# Patient Record
Sex: FEMALE | Race: WHITE | NOT HISPANIC OR LATINO | URBAN - METROPOLITAN AREA
[De-identification: names, ages, dates, MRNs, and addresses within clinical notes are randomized per-mention and may not be internally consistent; named-entity substitution may affect disease eponyms.]

---

## 2023-09-21 LAB
ALANINE AMINOTRANSFERASE (SGPT) (U/L) IN SER/PLAS: 21 U/L (ref 7–45)
ALBUMIN (G/DL) IN SER/PLAS: 4.3 G/DL (ref 3.4–5)
ALKALINE PHOSPHATASE (U/L) IN SER/PLAS: 71 U/L (ref 33–136)
ANION GAP IN SER/PLAS: 11 MMOL/L (ref 10–20)
ASPARTATE AMINOTRANSFERASE (SGOT) (U/L) IN SER/PLAS: 27 U/L (ref 9–39)
BILIRUBIN TOTAL (MG/DL) IN SER/PLAS: 0.6 MG/DL (ref 0–1.2)
CALCIUM (MG/DL) IN SER/PLAS: 9.4 MG/DL (ref 8.6–10.3)
CARBON DIOXIDE, TOTAL (MMOL/L) IN SER/PLAS: 28 MMOL/L (ref 21–32)
CHLORIDE (MMOL/L) IN SER/PLAS: 107 MMOL/L (ref 98–107)
CHOLESTEROL (MG/DL) IN SER/PLAS: 170 MG/DL (ref 0–199)
CHOLESTEROL IN HDL (MG/DL) IN SER/PLAS: 71.1 MG/DL
CHOLESTEROL/HDL RATIO: 2.4
CREATININE (MG/DL) IN SER/PLAS: 0.78 MG/DL (ref 0.5–1.05)
GFR FEMALE: 80 ML/MIN/1.73M2
GLUCOSE (MG/DL) IN SER/PLAS: 98 MG/DL (ref 74–99)
LDL: 80 MG/DL (ref 0–99)
POTASSIUM (MMOL/L) IN SER/PLAS: 3.4 MMOL/L (ref 3.5–5.3)
PROTEIN TOTAL: 7.6 G/DL (ref 6.4–8.2)
SODIUM (MMOL/L) IN SER/PLAS: 143 MMOL/L (ref 136–145)
THYROTROPIN (MIU/L) IN SER/PLAS BY DETECTION LIMIT <= 0.05 MIU/L: 1.35 MIU/L (ref 0.44–3.98)
THYROXINE (T4) FREE (NG/DL) IN SER/PLAS: 0.81 NG/DL (ref 0.61–1.12)
TRIGLYCERIDE (MG/DL) IN SER/PLAS: 93 MG/DL (ref 0–149)
UREA NITROGEN (MG/DL) IN SER/PLAS: 12 MG/DL (ref 6–23)
VLDL: 19 MG/DL (ref 0–40)

## 2023-10-27 ENCOUNTER — TELEPHONE (OUTPATIENT)
Dept: PRIMARY CARE | Facility: CLINIC | Age: 73
End: 2023-10-27
Payer: COMMERCIAL

## 2024-07-16 DIAGNOSIS — I10 ESSENTIAL HYPERTENSION: ICD-10-CM

## 2024-07-16 PROBLEM — R93.89 ABNORMAL THYROID ULTRASOUND: Status: ACTIVE | Noted: 2024-07-16

## 2024-07-16 PROBLEM — L65.9 THINNING HAIR: Status: ACTIVE | Noted: 2024-07-16

## 2024-07-16 PROBLEM — K63.5 POLYP OF TRANSVERSE COLON: Status: ACTIVE | Noted: 2017-05-25

## 2024-07-16 PROBLEM — K57.30 DIVERTICULOSIS OF LARGE INTESTINE WITHOUT HEMORRHAGE: Status: ACTIVE | Noted: 2017-05-25

## 2024-07-16 PROBLEM — H05.20 EXOPHTHALMOS: Status: ACTIVE | Noted: 2024-07-16

## 2024-07-16 PROBLEM — E78.5 HYPERLIPIDEMIA: Status: ACTIVE | Noted: 2024-07-16

## 2024-07-16 PROBLEM — F17.210 LIGHT CIGARETTE SMOKER: Status: ACTIVE | Noted: 2024-07-16

## 2024-07-16 PROBLEM — R79.89 ELEVATED TSH: Status: ACTIVE | Noted: 2024-07-16

## 2024-07-16 PROBLEM — E04.2 MULTIPLE THYROID NODULES: Status: ACTIVE | Noted: 2024-07-16

## 2024-07-16 PROBLEM — G47.00 INSOMNIA: Status: ACTIVE | Noted: 2024-07-16

## 2024-07-16 RX ORDER — AMLODIPINE BESYLATE AND ATORVASTATIN CALCIUM 5; 10 MG/1; MG/1
1 TABLET, FILM COATED ORAL DAILY
COMMUNITY
Start: 2022-06-21

## 2024-07-17 RX ORDER — AMLODIPINE BESYLATE AND ATORVASTATIN CALCIUM 5; 10 MG/1; MG/1
1 TABLET, FILM COATED ORAL DAILY
Qty: 90 TABLET | Refills: 3 | Status: SHIPPED | OUTPATIENT
Start: 2024-07-17

## 2024-09-23 ENCOUNTER — APPOINTMENT (OUTPATIENT)
Dept: PRIMARY CARE | Facility: CLINIC | Age: 74
End: 2024-09-23
Payer: COMMERCIAL

## 2024-09-23 VITALS
BODY MASS INDEX: 23.19 KG/M2 | WEIGHT: 126 LBS | HEART RATE: 56 BPM | HEIGHT: 62 IN | DIASTOLIC BLOOD PRESSURE: 70 MMHG | SYSTOLIC BLOOD PRESSURE: 110 MMHG | TEMPERATURE: 97.7 F

## 2024-09-23 DIAGNOSIS — Z23 IMMUNIZATION DUE: ICD-10-CM

## 2024-09-23 DIAGNOSIS — I10 ESSENTIAL HYPERTENSION: ICD-10-CM

## 2024-09-23 DIAGNOSIS — Z12.31 SCREENING MAMMOGRAM FOR BREAST CANCER: ICD-10-CM

## 2024-09-23 DIAGNOSIS — E04.1 THYROID NODULE: ICD-10-CM

## 2024-09-23 DIAGNOSIS — Z12.11 SCREENING FOR COLON CANCER: ICD-10-CM

## 2024-09-23 DIAGNOSIS — Z00.00 ROUTINE GENERAL MEDICAL EXAMINATION AT HEALTH CARE FACILITY: Primary | ICD-10-CM

## 2024-09-23 PROCEDURE — 3074F SYST BP LT 130 MM HG: CPT | Performed by: FAMILY MEDICINE

## 2024-09-23 PROCEDURE — 1124F ACP DISCUSS-NO DSCNMKR DOCD: CPT | Performed by: FAMILY MEDICINE

## 2024-09-23 PROCEDURE — 1159F MED LIST DOCD IN RCRD: CPT | Performed by: FAMILY MEDICINE

## 2024-09-23 PROCEDURE — 90677 PCV20 VACCINE IM: CPT | Performed by: FAMILY MEDICINE

## 2024-09-23 PROCEDURE — 3008F BODY MASS INDEX DOCD: CPT | Performed by: FAMILY MEDICINE

## 2024-09-23 PROCEDURE — 3078F DIAST BP <80 MM HG: CPT | Performed by: FAMILY MEDICINE

## 2024-09-23 PROCEDURE — G0009 ADMIN PNEUMOCOCCAL VACCINE: HCPCS | Performed by: FAMILY MEDICINE

## 2024-09-23 PROCEDURE — G0439 PPPS, SUBSEQ VISIT: HCPCS | Performed by: FAMILY MEDICINE

## 2024-09-23 PROCEDURE — 1036F TOBACCO NON-USER: CPT | Performed by: FAMILY MEDICINE

## 2024-09-23 RX ORDER — AMLODIPINE BESYLATE AND ATORVASTATIN CALCIUM 5; 10 MG/1; MG/1
1 TABLET, FILM COATED ORAL DAILY
Qty: 90 TABLET | Refills: 3 | Status: SHIPPED | OUTPATIENT
Start: 2024-09-23

## 2024-09-23 ASSESSMENT — PATIENT HEALTH QUESTIONNAIRE - PHQ9
2. FEELING DOWN, DEPRESSED OR HOPELESS: NOT AT ALL
SUM OF ALL RESPONSES TO PHQ9 QUESTIONS 1 AND 2: 0
1. LITTLE INTEREST OR PLEASURE IN DOING THINGS: NOT AT ALL

## 2024-09-23 NOTE — PROGRESS NOTES
"    /70   Pulse 56   Temp 36.5 °C (97.7 °F)   Ht 1.575 m (5' 2\")   Wt 57.2 kg (126 lb)   BMI 23.05 kg/m²     No past medical history on file.    Patient Active Problem List   Diagnosis    Abnormal thyroid ultrasound    Diverticulosis of large intestine without hemorrhage    Elevated TSH    Essential hypertension    Exophthalmos    Hyperlipidemia    Insomnia    Light cigarette smoker    Multiple thyroid nodules    Polyp of transverse colon    Thinning hair       Current Outpatient Medications   Medication Sig Dispense Refill    amlodipine-atorvastatin (Caduet) 5-10 mg tablet TAKE 1 TABLET BY MOUTH DAILY 90 tablet 3    amlodipine-atorvastatin (Caduet) 5-10 mg tablet Take 1 tablet by mouth once daily.       No current facility-administered medications for this visit.       CC/HPI/ASSESSMENT/PLAN    CC annual wellness visit    HPI patient 74-year-old female here for wellness visit.  No cognitive deficits noted.  Immunizations reviewed, she is agreeable to pneumonia vaccine, did not declines other vaccines.  She is overdue for colonoscopy.  She declines mammography.  She like an order for blood work.  She will need refills for her medications.  Blood pressure under good control.  Denies side effect of medication.  Patient with a history of thyroid goiter will need repeat ultrasound this year.  She sees ENT regards to the thyroid nodule as well.  Denies fever chills chest pain palpitation short of breath abdominal pain.  ROS negative except as noted above.  Past medical social surgical history is reviewed    Exam: Vital stable eyes no jaundice mouth moist neck supple no carotid bruit no goiter lungs CTA CV RRR Ext no edema skin no rash.  Examination of the head reveals indentations of the skull on the forehead with veins that will engorged.  The area is nontender    A/P 1.  Annual wellness visit.  No cognitive deficits noted.  She declines mammography.  Colonoscopy ordered.  Blood work is ordered.  Medicines " "refilled.  She will to have ultrasound of thyroid and follow-up with ENT.  I recommended that patient pursue consultation with a neurosurgeon to evaluate the abnormality noted of the forehead skull with vein present as discussed above.  Patient is agreeable.  Follow-up 1 year or sooner as needed    There are no diagnoses linked to this encounter. Subjective   Reason for Visit: Krystyna Toussaint is an 74 y.o. female here for a Medicare Wellness visit.          Reviewed all medications by prescribing practitioner or clinical pharmacist (such as prescriptions, OTCs, herbal therapies and supplements) and documented in the medical record.    HPI    Patient Care Team:  Jag Michael MD as PCP - General     Review of Systems    Objective   Vitals:  /70   Pulse 56   Temp 36.5 °C (97.7 °F)   Ht 1.575 m (5' 2\")   Wt 57.2 kg (126 lb)   BMI 23.05 kg/m²       Physical Exam    Assessment & Plan  Routine general medical examination at health care facility    Orders:    1 Year Follow Up In Primary Care - Wellness Exam; Future    Essential hypertension    Orders:    Comprehensive Metabolic Panel; Future    Lipid Panel; Future    Thyroid Stimulating Hormone; Future    Thyroxine, Free; Future    amlodipine-atorvastatin (Caduet) 5-10 mg tablet; Take 1 tablet by mouth once daily.    Thyroid nodule    Orders:    Thyroid Stimulating Hormone; Future    Thyroxine, Free; Future    US thyroid; Future    Screening for colon cancer    Orders:    Colonoscopy Screening; Average Risk Patient; Future    Screening mammogram for breast cancer    Orders:    BI mammo bilateral screening tomosynthesis; Future    Immunization due    Orders:    Pneumococcal conjugate vaccine, 20-valent (PREVNAR 20)              "

## 2024-09-23 NOTE — ASSESSMENT & PLAN NOTE
Orders:    Comprehensive Metabolic Panel; Future    Lipid Panel; Future    Thyroid Stimulating Hormone; Future    Thyroxine, Free; Future    amlodipine-atorvastatin (Caduet) 5-10 mg tablet; Take 1 tablet by mouth once daily.

## 2024-09-24 ENCOUNTER — LAB (OUTPATIENT)
Dept: LAB | Facility: LAB | Age: 74
End: 2024-09-24
Payer: MEDICARE

## 2024-09-24 DIAGNOSIS — E04.1 THYROID NODULE: ICD-10-CM

## 2024-09-24 DIAGNOSIS — I10 ESSENTIAL HYPERTENSION: ICD-10-CM

## 2024-09-24 LAB
ALBUMIN SERPL BCP-MCNC: 4.3 G/DL (ref 3.4–5)
ALP SERPL-CCNC: 73 U/L (ref 33–136)
ALT SERPL W P-5'-P-CCNC: 14 U/L (ref 7–45)
ANION GAP SERPL CALC-SCNC: 11 MMOL/L (ref 10–20)
AST SERPL W P-5'-P-CCNC: 17 U/L (ref 9–39)
BILIRUB SERPL-MCNC: 0.6 MG/DL (ref 0–1.2)
BUN SERPL-MCNC: 14 MG/DL (ref 6–23)
CALCIUM SERPL-MCNC: 9.4 MG/DL (ref 8.6–10.3)
CHLORIDE SERPL-SCNC: 105 MMOL/L (ref 98–107)
CHOLEST SERPL-MCNC: 177 MG/DL (ref 0–199)
CHOLESTEROL/HDL RATIO: 2.8
CO2 SERPL-SCNC: 30 MMOL/L (ref 21–32)
CREAT SERPL-MCNC: 0.78 MG/DL (ref 0.5–1.05)
EGFRCR SERPLBLD CKD-EPI 2021: 80 ML/MIN/1.73M*2
GLUCOSE SERPL-MCNC: 94 MG/DL (ref 74–99)
HDLC SERPL-MCNC: 63.7 MG/DL
LDLC SERPL CALC-MCNC: 86 MG/DL
NON HDL CHOLESTEROL: 113 MG/DL (ref 0–149)
POTASSIUM SERPL-SCNC: 3.8 MMOL/L (ref 3.5–5.3)
PROT SERPL-MCNC: 7 G/DL (ref 6.4–8.2)
SODIUM SERPL-SCNC: 142 MMOL/L (ref 136–145)
T4 FREE SERPL-MCNC: 0.88 NG/DL (ref 0.61–1.12)
TRIGL SERPL-MCNC: 136 MG/DL (ref 0–149)
TSH SERPL-ACNC: 1.36 MIU/L (ref 0.44–3.98)
VLDL: 27 MG/DL (ref 0–40)

## 2024-09-24 PROCEDURE — 84439 ASSAY OF FREE THYROXINE: CPT

## 2024-09-24 PROCEDURE — 36415 COLL VENOUS BLD VENIPUNCTURE: CPT

## 2024-09-24 PROCEDURE — 80061 LIPID PANEL: CPT

## 2024-09-24 PROCEDURE — 80053 COMPREHEN METABOLIC PANEL: CPT

## 2024-09-24 PROCEDURE — 84443 ASSAY THYROID STIM HORMONE: CPT

## 2024-09-25 NOTE — RESULT ENCOUNTER NOTE
Blood work reviewed.  Thyroid liver kidney sugar cholesterol levels look good.  Continue current medicine

## 2024-09-27 ENCOUNTER — TELEPHONE (OUTPATIENT)
Dept: PRIMARY CARE | Facility: CLINIC | Age: 74
End: 2024-09-27
Payer: MEDICARE

## 2024-09-27 NOTE — TELEPHONE ENCOUNTER
Pt would like to know if she should still see Dr. Foy and have an Ultrasound done? With extra pannels drawn on her thyroid.

## 2024-09-30 NOTE — TELEPHONE ENCOUNTER
Would recommend getting ultrasound of thyroid, ultrasound is ordered in the chart.  Would recommend following up with Dr. Aggarwal after ultrasound complete

## 2025-08-18 DIAGNOSIS — I10 ESSENTIAL HYPERTENSION: ICD-10-CM

## 2025-08-20 RX ORDER — AMLODIPINE BESYLATE AND ATORVASTATIN CALCIUM 5; 10 MG/1; MG/1
1 TABLET, FILM COATED ORAL DAILY
Qty: 90 TABLET | Refills: 0 | Status: SHIPPED | OUTPATIENT
Start: 2025-08-20

## 2025-09-04 ENCOUNTER — PATIENT OUTREACH (OUTPATIENT)
Dept: PRIMARY CARE | Facility: CLINIC | Age: 75
End: 2025-09-04
Payer: MEDICARE

## 2025-09-04 DIAGNOSIS — Z00.00 WELL ADULT EXAM: ICD-10-CM
